# Patient Record
Sex: FEMALE | Race: WHITE | NOT HISPANIC OR LATINO | Employment: PART TIME | URBAN - METROPOLITAN AREA
[De-identification: names, ages, dates, MRNs, and addresses within clinical notes are randomized per-mention and may not be internally consistent; named-entity substitution may affect disease eponyms.]

---

## 2017-01-05 ENCOUNTER — GENERIC CONVERSION - ENCOUNTER (OUTPATIENT)
Dept: OTHER | Facility: OTHER | Age: 47
End: 2017-01-05

## 2017-03-13 ENCOUNTER — ALLSCRIPTS OFFICE VISIT (OUTPATIENT)
Dept: OTHER | Facility: OTHER | Age: 47
End: 2017-03-13

## 2017-05-22 ENCOUNTER — ALLSCRIPTS OFFICE VISIT (OUTPATIENT)
Dept: OTHER | Facility: OTHER | Age: 47
End: 2017-05-22

## 2017-06-27 ENCOUNTER — GENERIC CONVERSION - ENCOUNTER (OUTPATIENT)
Dept: OTHER | Facility: OTHER | Age: 47
End: 2017-06-27

## 2017-06-27 DIAGNOSIS — N92.6 IRREGULAR MENSTRUATION: ICD-10-CM

## 2017-07-31 ENCOUNTER — ALLSCRIPTS OFFICE VISIT (OUTPATIENT)
Dept: OTHER | Facility: OTHER | Age: 47
End: 2017-07-31

## 2017-10-23 ENCOUNTER — ALLSCRIPTS OFFICE VISIT (OUTPATIENT)
Dept: OTHER | Facility: OTHER | Age: 47
End: 2017-10-23

## 2018-01-09 NOTE — PROGRESS NOTES
Chief Complaint  Patient is here for her Depo Provera 150mg injection  Injection was given in the Right Deltoid with out any difficulty  She did not want the injection given in her buttock  Her next injection will be given at her yearly visit with Servando Baker in [place]  Filled Depo Provera once so that she has one to bring  - KM    Depo Provera Anai Jobs  Lot: [id number]  Exp:01/31/2022  NDC: 44502376773         Active Problems    1  Abdominal pain (789 00) (R10 9)   2  Arthralgia (719 40) (M25 50)   3  Bacterial vaginosis (616 10,041 9) (N76 0,B96 89)   4  Contraception (V25 9) (Z30 9)   5  Depression with anxiety (300 4) (F41 8)   6  Dermoid cyst of upper extremity, right (216 6) (D23 61)   7  Encounter for gynecological examination without abnormal finding (V72 31) (Z01 419)   8  Encounter for management and injection of depo-Provera (V25 49) (Z30 42)   9  Encounter for screening mammogram for malignant neoplasm of breast (V76 12)   (Z12 31)   10  Generalized anxiety disorder (300 02) (F41 1)   11  Herpes simplex infection (054 9) (B00 9)   12  Insomnia (780 52) (G47 00)   13  Irregular bleeding (626 4) (N92 6)   14  Reactive airway disease (493 90) (J45 909)    Current Meds   1  ALPRAZolam 0 5 MG Oral Tablet; TAKE 1 TABLET BY MOUTH TWICE A DAY AS   NEEDED; Therapy: 70Ygf0938 to ([place])  Requested for: 27Jun2016; Last   Rx:27Jun2016 Ordered   2  MedroxyPROGESTERone Acetate 150 MG/ML Intramuscular Suspension; INJECT   INTRAMUSCULARLY EVERY 12 WEEKS AS DIRECTED; Therapy: 94IZW0214-; Last Rx:17Czi2600; Status: NEED INFORMATION - Billable   Problem Ordered   3  Meloxicam 15 MG Oral Tablet; Therapy: (Recorded:13Oct2016) to Recorded   4  Multi-Vitamin Daily Oral Tablet Recorded   5  Proventil  (90 Base) MCG/ACT Inhalation Aerosol Solution; INHALE 1 TO 2   PUFFS EVERY 4 TO 6 HOURS AS NEEDED  Requested for: 09DCT5234; Last   Rx:26Jan2016 Ordered   6  Tinidazole 500 MG Oral Tablet;  Two po daily x five days;   Therapy: 85BUZ3277 to (Evaluate:18Oct2016); Last Rx:13Oct2016 Ordered   7  TraMADol HCl - 50 MG Oral Tablet; ONE PO Q 8 HRS  PRN; Therapy: 44Vbu2717 to (Last Rx:27Jun2016) Ordered    Allergies    1  Bactrim DS TABS   2  Sulfa Drugs   3   Lexapro    Vitals  Signs    Systolic: 684, LUE, Sitting  Diastolic: 78, LUE, Sitting  Height: 5 ft 5 in  Weight: 141 lb   BMI Calculated: 23 46  BSA Calculated: 1 71  LMP: Depo    Plan  Contraception    · MedroxyPROGESTERone Acetate 150 MG/ML Intramuscular Suspension  (Depo-Provera); INJECT EVERY 12 WEEKS AS DIRECTED  Encounter for management and injection of depo-Provera    · Depo-Provera 150 MG/ML Intramuscular Suspension  (MedroxyPROGESTERone Acetate)    Future Appointments    Date/Time Provider Specialty Site   01/16/2018 09:40 AM Mario Little, [address] [facility]     Signatures   Electronically signed by : WASHINGTON Aparicio; Oct 23 2017 12:27PM EST                       (Author)    Electronically signed by : Chet Murphy DO; Oct 23 2017  1:46PM EST

## 2018-01-10 NOTE — PROGRESS NOTES
Chief Complaint  Pt is here for Depo shot given on right buttocks      Active Problems    1  Abdominal pain (789 00) (R10 9)   2  Arthralgia (719 40) (M25 50)   3  Bacterial vaginosis (616 10,041 9) (N76 0,B96 89)   4  Contraception (V25 9) (Z30 9)   5  Depression with anxiety (300 4) (F41 8)   6  Dermoid cyst of upper extremity, right (216 6) (D23 61)   7  Encounter for gynecological examination without abnormal finding (V72 31) (Z01 419)   8  Encounter for management and injection of depo-Provera (V25 49) (Z30 42)   9  Encounter for screening mammogram for malignant neoplasm of breast (V76 12)   (Z12 31)   10  Generalized anxiety disorder (300 02) (F41 1)   11  Herpes simplex infection (054 9) (B00 9)   12  Insomnia (780 52) (G47 00)   13  Irregular bleeding (626 4) (N92 6)   14  Reactive airway disease (493 90) (J45 909)    Current Meds   1  ALPRAZolam 0 5 MG Oral Tablet; TAKE 1 TABLET BY MOUTH TWICE A DAY AS   NEEDED; Therapy: 99Tsf6469 to (Brianna Martinez)  Requested for: 27Jun2016; Last   Rx:27Jun2016 Ordered   2  MedroxyPROGESTERone Acetate 150 MG/ML Intramuscular Suspension; INJECT   INTRAMUSCULARLY EVERY 12 WEEKS AS DIRECTED; Therapy: 72ZEU5780-; Last Rx:10Pke3423; Status: NEED INFORMATION - Billable   Problem Ordered   3  Meloxicam 15 MG Oral Tablet; Therapy: (Recorded:13Oct2016) to Recorded   4  Multi-Vitamin Daily Oral Tablet Recorded   5  Proventil  (90 Base) MCG/ACT Inhalation Aerosol Solution; INHALE 1 TO 2   PUFFS EVERY 4 TO 6 HOURS AS NEEDED  Requested for: 50EKP3244; Last   Rx:26Jan2016 Ordered   6  Tinidazole 500 MG Oral Tablet; Two po daily x five days; Therapy: 05TFD3629 to (Evaluate:18Oct2016); Last Rx:13Oct2016 Ordered   7  TraMADol HCl - 50 MG Oral Tablet; ONE PO Q 8 HRS  PRN; Therapy: 70Zve9997 to (Last Rx:27Jun2016) Ordered    Allergies    1  Bactrim DS TABS   2   Sulfa Drugs    Vitals  Signs    Systolic: 820, RUE, Sitting  Diastolic: 80, RUE, Sitting  Height: 5 ft 5 in  Weight: 138 lb   BMI Calculated: 22 96  BSA Calculated: 1 69  LMP: Depo    Future Appointments    Date/Time Provider Specialty Site   10/23/2017 10:00 AM [facility] INJECTION, Nurse Schedule  [facility] OB     Signatures   Electronically signed by : Gabriel Ayala MA; Jul 31 2017  2:14PM EST                       (Author)    Electronically signed by : Kaila Isbell, [facility]; Jul 31 2017  2:25PM EST                       (Author)    Electronically signed by : Kierra Valero MD; Jul 31 2017  8:03PM EST

## 2018-01-10 NOTE — MISCELLANEOUS
Message   Recorded as Task   Date: 03/07/2016 01:25 PM, Created By: Carroll Tucker   Task Name: Call Back   Assigned To: Jamal Trujillo   Regarding Patient: Lionel Abdi, Status: Active   Comment:    AmttAmishNyla - 07 Mar 2016 1:25 PM     TASK CREATED  Caller: Self; [phone] (Home); [phone] (Work)  see previous task 3/7/16    pt is returning the message she received and is asking for a call back [address] [place] [address] Mar 2016 2:20 PM     TASK EDITED   Javier,[place] - 07 Mar 2016 2:22 PM     TASK EDITED  called pt again- L/m for her to call prior to 4pm        Active Problems    1  Arthralgia (719 40) (M25 50)   2  Contraceptives (V25 02)   3  Depression with anxiety (300 4) (F41 8)   4  Encounter for gynecological examination without abnormal finding (V72 31) (Z01 419)   5  Encounter for mammogram to establish baseline mammogram (V76 12) (Z12 31)   6  Encounter for management and injection of depo-Provera (V25 49) (Z30 42)   7  Generalized anxiety disorder (300 02) (F41 1)   8  Herpes simplex infection (054 9) (B00 9)   9  Reactive airway disease (493 90) (J45 909)   10  Surveillance for medroxyprogesterone contraception (V25 49) (Z30 42)    Current Meds   1  ALPRAZolam 0 5 MG Oral Tablet; TAKE 1 TABLET BY MOUTH TWICE A DAY AS   NEEDED; Therapy: 30Pzq3453 to (Hugo Gallardo)  Requested for: 03KYL0090; Last   Rx:13Kot6930 Ordered   2  Escitalopram Oxalate 10 MG Oral Tablet; TAKE 1 TABLET EVERY DAY; Therapy: 77JOR7140 to (Evaluate:38Ejv7421)  Requested for: 06UDC5167; Last   Rx:95Bxa5885 Ordered   3  MedroxyPROGESTERone Acetate 150 MG/ML Intramuscular Suspension; INJECT 1 ML   INTRAMUSCULARLY ONCE EVERY 3 MONTHS; To Be Done: 99FBX3735; Status:   HOLD FOR - Administration Ordered   4  MedroxyPROGESTERone Acetate 150 MG/ML Intramuscular Suspension; INJECT 1 ML   INTRAMUSCULARLY ONCE EVERY 3 MONTHS; To Be Done: 23JXI8712; Status:   HOLD FOR - Administration Ordered   5  MedroxyPROGESTERone Acetate 150 MG/ML Intramuscular Suspension; INJECT 1 ML   INTRAMUSCULARLY ONCE EVERY 3 MONTHS; To Be Done: 54ATM0569; Status:   HOLD FOR - Administration Ordered   6  MedroxyPROGESTERone Acetate 150 MG/ML Intramuscular Suspension; INJECT 1 ML   INTRAMUSCULARLY ONCE EVERY 3 MONTHS; To Be Done: 51OSF1419; Status:   HOLD FOR - Administration Ordered   7  MedroxyPROGESTERone Acetate 150 MG/ML Intramuscular Suspension; inject 1 ml   intramuscularly once every 3 months; Therapy: 05QJY6949 to (Evaluate:27Vuo9724)  Requested for: 48Qzm5818; Last   Rx:31Jyl3371 Ordered   8  MedroxyPROGESTERone Acetate 150 MG/ML Intramuscular Suspension; INJECT   EVERY 12 WEEKS AS DIRECTED; To Be Done:   16Pdj2193; Status: HOLD FOR - Administration Ordered   9  MedroxyPROGESTERone Acetate 150 MG/ML Intramuscular Suspension; INJECT   INTRAMUSCULARLY EVERY 12 WEEKS AS DIRECTED; To Be   Done: 72UJX4570; Status: HOLD FOR - Administration Ordered   10  Multi-Vitamin Daily Oral Tablet Recorded   11  Proventil  (90 Base) MCG/ACT Inhalation Aerosol Solution; INHALE 1 TO 2    PUFFS EVERY 4 TO 6 HOURS AS NEEDED  Requested for: 11MGC6336; Last    Rx:51Nqd1687 Ordered    Allergies    1  Bactrim DS TABS   2   Sulfa Drugs    Signatures   Electronically signed by : Pankaj Gross RN; Mar  9 2016  9:57AM EST                       (Author)

## 2018-01-10 NOTE — MISCELLANEOUS
Message   Recorded as Task   Date: 09/28/2016 10:01 AM, Created By: Joe Alford   Task Name: Med Renewal Request   Assigned To: Allie Jackson   Regarding Patient: Mercedez Galicia, Status: Active   Comment:    Nataly Guardado - 28 Sep 2016 10:01 AM     TASK CREATED  PT NEEDS A REFILL ON HER DEPO TO BE SENT TO [facility] [place]   Allie Jackson - 28 Sep 2016 10:14 AM     TASK EDITED                 sent to pharm        Active Problems    1  Arthralgia (719 40) (M25 50)   2  Bacterial vaginosis (616 10,041 9) (N76 0,B96 89)   3  Contraceptives (V25 02)   4  Depression with anxiety (300 4) (F41 8)   5  Dermoid cyst of upper extremity, right (216 6) (D23 61)   6  Encounter for gynecological examination without abnormal finding (V72 31) (Z01 419)   7  Encounter for mammogram to establish baseline mammogram (V76 12) (Z12 31)   8  Encounter for management and injection of depo-Provera (V25 49) (Z30 42)   9  Generalized anxiety disorder (300 02) (F41 1)   10  Herpes simplex infection (054 9) (B00 9)   11  Insomnia (780 52) (G47 00)   12  Reactive airway disease (493 90) (J45 909)   13  Skin lesion (709 9) (L98 9)   14  Surveillance for medroxyprogesterone contraception (V25 49) (Z30 42)    Current Meds   1  ALPRAZolam 0 5 MG Oral Tablet; TAKE 1 TABLET BY MOUTH TWICE A DAY AS   NEEDED; Therapy: 23Hqh0643 to (Sonal Roach)  Requested for: 27Jun2016; Last   Rx:27Jun2016 Ordered   2  Escitalopram Oxalate 10 MG Oral Tablet; take one tablet by mouth every day; Therapy: 14QKC8060 to (Evaluate:96Glh5251)  Requested for: 49Rzu0416; Last   Rx:71Gum5691 Ordered   3  MedroxyPROGESTERone Acetate 150 MG/ML Intramuscular Suspension   (Depo-Provera); inject 1 ml intramuscularly once every 3   months; Therapy: 94HIN2147-  Requested for: 96EBR6491; Last Rx:27Rop3728; Status: NEED   INFORMATION - Billable Problem Ordered   4  Multi-Vitamin Daily Oral Tablet Recorded   5   Proventil  (90 Base) MCG/ACT Inhalation Aerosol Solution; INHALE 1 TO 2   PUFFS EVERY 4 TO 6 HOURS AS NEEDED  Requested for: 84LTV9423; Last   Rx:26Jan2016 Ordered   6  TraMADol HCl - 50 MG Oral Tablet; ONE PO Q 8 HRS  PRN; Therapy: 30Apr2015 to (Last Rx:27Jun2016) Ordered   7  Zolpidem Tartrate 5 MG Oral Tablet; TAKE ONE TABLET AT BEDTIME AS NEEDED FOR   SLEEP; Therapy: 28Apr2016 to (Evaluate:00Jiv8513); Last Rx:28Apr2016 Ordered    Allergies    1  Bactrim DS TABS   2   Sulfa Drugs    Plan  Contraceptives    · MedroxyPROGESTERone Acetate 150 MG/ML Intramuscular Suspension; INJECT  INTRAMUSCULARLY EVERY 12 WEEKS AS DIRECTED    Signatures   Electronically signed by : Sarahy Buchanan, ; Sep 28 2016 10:14AM EST                       (Author)

## 2018-01-11 NOTE — MISCELLANEOUS
Message   Recorded as Task   Date: 03/08/2016 04:29 PM, Created By: Oxana Chapman   Task Name: Follow Up   Assigned To: Brentdeng Sg   Regarding Patient: Madeline Lopez, Status: In Progress   Comment:    Kendal Grier - 08 Mar 2016 4:29 PM     TASK CREATED  lm for pt tcb re flagyl rx   Kendal Grier - 09 Mar 2016 8:17 AM     TASK IN PROGRESS   Kendal Grier - 09 Mar 2016 8:24 AM     TASK EDITED  left message that rx was sent to pharm  on 03/07 by cg    tcb with any questions        Active Problems    1  Arthralgia (719 40) (M25 50)   2  Bacterial vaginosis (616 10,041 9) (N76 0,B96 89)   3  Contraceptives (V25 02)   4  Depression with anxiety (300 4) (F41 8)   5  Encounter for gynecological examination without abnormal finding (V72 31) (Z01 419)   6  Encounter for mammogram to establish baseline mammogram (V76 12) (Z12 31)   7  Encounter for management and injection of depo-Provera (V25 49) (Z30 42)   8  Generalized anxiety disorder (300 02) (F41 1)   9  Herpes simplex infection (054 9) (B00 9)   10  Reactive airway disease (493 90) (J45 909)   11  Surveillance for medroxyprogesterone contraception (V25 49) (Z30 42)    Current Meds   1  ALPRAZolam 0 5 MG Oral Tablet; TAKE 1 TABLET BY MOUTH TWICE A DAY AS   NEEDED; Therapy: 43Reh7194 to (Marine Rm)  Requested for: 27WHZ3964; Last   Rx:98Yqg0492 Ordered   2  Escitalopram Oxalate 10 MG Oral Tablet; TAKE 1 TABLET EVERY DAY; Therapy: 74UMM6671 to (Evaluate:44Rvy3441)  Requested for: 91BHT9617; Last   Rx:52Xfz2366 Ordered   3  MedroxyPROGESTERone Acetate 150 MG/ML Intramuscular Suspension   (Depo-Provera); inject 1 ml intramuscularly once every 3   months; Therapy: 89YLR8022 to (Evaluate:12Znt4861)  Requested for: 60Qpt5162; Last   Rx:46Taa6545 Ordered   4  MedroxyPROGESTERone Acetate 150 MG/ML Intramuscular Suspension; INJECT 1 ML   INTRAMUSCULARLY ONCE EVERY 3 MONTHS; To Be Done: 52DWU6242; Status:   HOLD FOR - Administration Ordered   5  MedroxyPROGESTERone Acetate 150 MG/ML Intramuscular Suspension; INJECT 1 ML   INTRAMUSCULARLY ONCE EVERY 3 MONTHS; To Be Done: 47IYU6070; Status:   HOLD FOR - Administration Ordered   6  MedroxyPROGESTERone Acetate 150 MG/ML Intramuscular Suspension; INJECT 1 ML   INTRAMUSCULARLY ONCE EVERY 3 MONTHS; To Be Done: 46RPZ4465; Status:   HOLD FOR - Administration Ordered   7  MedroxyPROGESTERone Acetate 150 MG/ML Intramuscular Suspension; INJECT 1 ML   INTRAMUSCULARLY ONCE EVERY 3 MONTHS; To Be Done: 57NBQ4976; Status:   HOLD FOR - Administration Ordered   8  MedroxyPROGESTERone Acetate 150 MG/ML Intramuscular Suspension; INJECT   EVERY 12 WEEKS AS DIRECTED; To Be Done:   47Zya7457; Status: HOLD FOR - Administration Ordered   9  MedroxyPROGESTERone Acetate 150 MG/ML Intramuscular Suspension; INJECT   INTRAMUSCULARLY EVERY 12 WEEKS AS DIRECTED; To Be   Done: 01BUO6685; Status: HOLD FOR - Administration Ordered   10  MetroNIDAZOLE 500 MG Oral Tablet (Flagyl); TAKE 1 TABLET TWICE DAILY UNTIL    FINISHED; Therapy: 78HAV6242 to (Evaluate:14Mar2016)  Requested for: 26ACR0432; Last    Rx:07Mar2016 Ordered   11  Multi-Vitamin Daily Oral Tablet Recorded   12  Proventil  (90 Base) MCG/ACT Inhalation Aerosol Solution; INHALE 1 TO 2    PUFFS EVERY 4 TO 6 HOURS AS NEEDED  Requested for: 38CQW3230; Last    Rx:26Jan2016 Ordered   13  TraMADol HCl - 50 MG Oral Tablet; ONE PO Q 8 HRS  PRN; Therapy: 30Apr2015 to (Last Rx:07Mar2016) Ordered    Allergies    1  Bactrim DS TABS   2   Sulfa Drugs    Signatures   Electronically signed by : Keila Preston, ; Mar  9 2016  8:24AM EST                       (Author)

## 2018-01-12 NOTE — MISCELLANEOUS
Message   Recorded as Task   Date: 03/07/2016 09:44 AM, Created By: Maverick Love   Task Name: Medical Complaint Callback   Assigned To: Cholo Jerry   Regarding Patient: Flo Farrell, Status: Active   Comment:    Maverick Love - 07 Mar 2016 9:44 AM     TASK CREATED  Caller: Self; Medical Complaint; [phone] (Home); [phone] (Work)  pt called - thinks has a BV - thick pasty discharge, slight odor - said had previously - please advise  [phone]  her pharm:  [facility] in [facility] - 07 Mar 2016 12:57 PM     TASK EDITED  attempted to return pts' p c   ,L/M for pt to return call today if possible        Active Problems    1  Arthralgia (719 40) (M25 50)   2  Contraceptives (V25 02)   3  Depression with anxiety (300 4) (F41 8)   4  Encounter for gynecological examination without abnormal finding (V72 31) (Z01 419)   5  Encounter for mammogram to establish baseline mammogram (V76 12) (Z12 31)   6  Encounter for management and injection of depo-Provera (V25 49) (Z30 42)   7  Generalized anxiety disorder (300 02) (F41 1)   8  Herpes simplex infection (054 9) (B00 9)   9  Reactive airway disease (493 90) (J45 909)   10  Surveillance for medroxyprogesterone contraception (V25 49) (Z30 42)    Current Meds   1  ALPRAZolam 0 5 MG Oral Tablet; TAKE 1 TABLET BY MOUTH TWICE A DAY AS   NEEDED; Therapy: 78Kwt1731 to (Joby Garcia)  Requested for: 45FQA9320; Last   Rx:53Lnf5123 Ordered   2  Escitalopram Oxalate 10 MG Oral Tablet; TAKE 1 TABLET EVERY DAY; Therapy: 56ZOX3655 to (Evaluate:31Qfr5226)  Requested for: 72TGR4973; Last   Rx:18Qin3070 Ordered   3  MedroxyPROGESTERone Acetate 150 MG/ML Intramuscular Suspension; INJECT 1 ML   INTRAMUSCULARLY ONCE EVERY 3 MONTHS; To Be Done: 08CDR4642; Status:   HOLD FOR - Administration Ordered   4  MedroxyPROGESTERone Acetate 150 MG/ML Intramuscular Suspension; INJECT 1 ML   INTRAMUSCULARLY ONCE EVERY 3 MONTHS;  To Be Done: 47BTJ6965; Status:   HOLD FOR - Administration Ordered   5  MedroxyPROGESTERone Acetate 150 MG/ML Intramuscular Suspension; INJECT 1 ML   INTRAMUSCULARLY ONCE EVERY 3 MONTHS; To Be Done: 18TBF0978; Status:   HOLD FOR - Administration Ordered   6  MedroxyPROGESTERone Acetate 150 MG/ML Intramuscular Suspension; INJECT 1 ML   INTRAMUSCULARLY ONCE EVERY 3 MONTHS; To Be Done: 88OKR7398; Status:   HOLD FOR - Administration Ordered   7  MedroxyPROGESTERone Acetate 150 MG/ML Intramuscular Suspension; inject 1 ml   intramuscularly once every 3 months; Therapy: 95KLT8175 to (Evaluate:26Ngx9340)  Requested for: 60Ijp5106; Last   Rx:08Fti0619 Ordered   8  MedroxyPROGESTERone Acetate 150 MG/ML Intramuscular Suspension; INJECT   EVERY 12 WEEKS AS DIRECTED; To Be Done:   59Jeo2395; Status: HOLD FOR - Administration Ordered   9  MedroxyPROGESTERone Acetate 150 MG/ML Intramuscular Suspension; INJECT   INTRAMUSCULARLY EVERY 12 WEEKS AS DIRECTED; To Be   Done: 63EON3524; Status: HOLD FOR - Administration Ordered   10  Multi-Vitamin Daily Oral Tablet Recorded   11  Proventil  (90 Base) MCG/ACT Inhalation Aerosol Solution; INHALE 1 TO 2    PUFFS EVERY 4 TO 6 HOURS AS NEEDED  Requested for: 62VHY0707; Last    Rx:44Hbr5780 Ordered    Allergies    1  Bactrim DS TABS   2   Sulfa Drugs    Signatures   Electronically signed by : Amena Bianchi RN; Mar  9 2016  9:56AM EST                       (Author)

## 2018-01-12 NOTE — PROGRESS NOTES
Chief Complaint  Depo Injection 150 mg  Lot # [id number]  Exp 07/2019  NDC [address]-1  Site Right Buttock      Active Problems    1  Abdominal pain (789 00) (R10 9)   2  Arthralgia (719 40) (M25 50)   3  Bacterial vaginosis (616 10,041 9) (N76 0,B96 89)   4  Depression with anxiety (300 4) (F41 8)   5  Dermoid cyst of upper extremity, right (216 6) (D23 61)   6  Encounter for gynecological examination without abnormal finding (V72 31) (Z01 419)   7  Encounter for management and injection of depo-Provera (V25 49) (Z30 42)   8  Encounter for screening mammogram for malignant neoplasm of breast (V76 12)   (Z12 31)   9  Generalized anxiety disorder (300 02) (F41 1)   10  Herpes simplex infection (054 9) (B00 9)   11  Insomnia (780 52) (G47 00)   12  Reactive airway disease (493 90) (J45 909)    Current Meds   1  ALPRAZolam 0 5 MG Oral Tablet; TAKE 1 TABLET BY MOUTH TWICE A DAY AS   NEEDED; Therapy: 00Xcv1086 to (Remington Daigle)  Requested for: 27Jun2016; Last   Rx:27Jun2016 Ordered   2  MedroxyPROGESTERone Acetate 150 MG/ML Intramuscular Suspension; INJECT   INTRAMUSCULARLY EVERY 12 WEEKS AS DIRECTED; Therapy: 63KXK3968-; Last Rx:45Bcb2713; Status: NEED INFORMATION - Billable   Problem Ordered   3  Meloxicam 15 MG Oral Tablet; Therapy: (Recorded:13Oct2016) to Recorded   4  Multi-Vitamin Daily Oral Tablet Recorded   5  Proventil  (90 Base) MCG/ACT Inhalation Aerosol Solution; INHALE 1 TO 2   PUFFS EVERY 4 TO 6 HOURS AS NEEDED  Requested for: 55WWU3978; Last   Rx:26Jan2016 Ordered   6  Tinidazole 500 MG Oral Tablet; Two po daily x five days; Therapy: 12FOQ1832 to (Evaluate:18Oct2016); Last Rx:13Oct2016 Ordered   7  TraMADol HCl - 50 MG Oral Tablet; ONE PO Q 8 HRS  PRN; Therapy: 57Mby3469 to (Last Rx:27Jun2016) Ordered    Allergies    1  Bactrim DS TABS   2   Sulfa Drugs    Vitals  Signs    Systolic: 977, LUE, Sitting  Diastolic: 64, LUE, Sitting  Height: 5 ft 5 in  Weight: 144 lb 5 oz  BMI Calculated: 24 01  BSA Calculated: 1 72  LMP: Depo Injection    Plan  Encounter for management and injection of depo-Provera    · MedroxyPROGESTERone Acetate 150 MG/ML Intramuscular Suspension    Future Appointments    Date/Time Provider Specialty Site   05/22/2017 10:00 AM EMMAGA INJECTION, Nurse Schedule  [facility]     Signatures   Electronically signed by : WASHINGTON Restrepo; Mar 13 2017 10:34AM EST                       (Author)    Electronically signed by : Elliot Simmons DO; Mar 15 2017  9:21AM EST

## 2018-01-13 VITALS
SYSTOLIC BLOOD PRESSURE: 110 MMHG | HEIGHT: 65 IN | BODY MASS INDEX: 23.49 KG/M2 | WEIGHT: 141 LBS | DIASTOLIC BLOOD PRESSURE: 78 MMHG

## 2018-01-13 NOTE — MISCELLANEOUS
Message   Recorded as Task   Date: 01/05/2017 11:19 AM, Created By: Johanna Bradley   Task Name: Call Back   Assigned To: DAYANARA GYN,Team   Regarding Patient: Cruzito Hunter, Status: In Progress   Comment:    Leatha Arias - 05 Jan 2017 11:19 AM     TASK CREATED  Caller: Self; Other; [phone] (Home); [phone] (Work)  pts bacterial inf came back, w87 stated for her to call back & we would call a different rx in, pls call pt @ [facility] Jan 2017 11:38 AM     TASK [address] - 05 Jan 2017 11:41 AM     TASK EDITED  lmtcb  Pt was given Clindesse savings cARD AT [facility]  Eleazar Winter - 05 Jan 2017 12:48 PM     TASK EDITED  pt says she has a thick, pasty dsch and odor  May I rx Clindese? Megha Laughlin - 05 Jan 2017 1:56 PM     TASK REPLIED TO: Previously Assigned To Megha Laughlin  yes x   Eleazar Winter - 05 Jan 2017 3:27 PM     TASK EDITED  Rx clindesse to ehr  Pt has savings card        Active Problems    1  Abdominal pain (789 00) (R10 9)   2  Arthralgia (719 40) (M25 50)   3  Bacterial vaginosis (616 10,041 9) (N76 0,B96 89)   4  Depression with anxiety (300 4) (F41 8)   5  Dermoid cyst of upper extremity, right (216 6) (D23 61)   6  Encounter for gynecological examination without abnormal finding (V72 31) (Z01 419)   7  Encounter for management and injection of depo-Provera (V25 49) (Z30 42)   8  Encounter for screening mammogram for malignant neoplasm of breast (V76 12)   (Z12 31)   9  Generalized anxiety disorder (300 02) (F41 1)   10  Herpes simplex infection (054 9) (B00 9)   11  Insomnia (780 52) (G47 00)   12  Reactive airway disease (493 90) (J45 909)    Current Meds   1  ALPRAZolam 0 5 MG Oral Tablet; TAKE 1 TABLET BY MOUTH TWICE A DAY AS   NEEDED; Therapy: 74Mgf1739 to (Freya Brown)  Requested for: 27Jun2016; Last   Rx:27Jun2016 Ordered   2   MedroxyPROGESTERone Acetate 150 MG/ML Intramuscular Suspension; INJECT INTRAMUSCULARLY EVERY 12 WEEKS AS DIRECTED; Therapy: 18YHR9300-; Last Rx:75Cbn4392; Status: NEED INFORMATION - Billable   Problem Ordered   3  Meloxicam 15 MG Oral Tablet; Therapy: (Recorded:13Oct2016) to Recorded   4  Multi-Vitamin Daily Oral Tablet Recorded   5  Proventil  (90 Base) MCG/ACT Inhalation Aerosol Solution; INHALE 1 TO 2   PUFFS EVERY 4 TO 6 HOURS AS NEEDED  Requested for: 12QDU0086; Last   Rx:26Jan2016 Ordered   6  Tinidazole 500 MG Oral Tablet (Tindamax); Two po daily x five days; Therapy: 97DAY3974 to (Evaluate:18Oct2016); Last Rx:13Oct2016 Ordered   7  TraMADol HCl - 50 MG Oral Tablet; ONE PO Q 8 HRS  PRN; Therapy: 30Apr2015 to (Last Rx:27Jun2016) Ordered    Allergies    1  Bactrim DS TABS   2  Sulfa Drugs    Plan  Bacterial vaginosis    · Clindesse 2 % Vaginal Cream; INSERT 1 APPLICATORFUL INTRAVAGINALLY AT  BEDTIME    Signatures   Electronically signed by :  Pola Hernandez, ; Jan 5 2017  3:29PM EST                       (Author)

## 2018-01-13 NOTE — MISCELLANEOUS
Message   Recorded as Task   Date: 06/27/2017 12:44 PM, Created By: Abram Combs   Task Name: Medical Complaint Callback   Assigned To: Hiram Dover   Regarding Patient: Daniel Reyez, Status: Active   Comment:    Tammy Keller - 27 Jun 2017 12:44 PM     TASK CREATED  Caller: Self; Medical Complaint; [phone] (Home)  Pt called and would like to discuss her problems w the Depo shot  Pt is having constant bleeding and frequent headaches  Pt would like to speak directly with you  Megha Laughlin - 27 Jun 2017 1:07 PM     TASK REPLIED TO: Previously Assigned To Megha Laughlin  left message for pt to call back   LocFide brown - 27 Jun 2017 1:33 PM     TASK EDITED  pt returning your call-will await callback  thanks  Megha Laughlin - 27 Jun 2017 2:58 PM     TASK REPLIED TO: Previously Assigned To [employer] to pt  On Depo  She reports "a few months of irregular bleeding "  She wrote down starting 5/16 but had happened prior  Notes bleeding 5/16-20  Depo 5/22  Bled 5/26-5/31, 6/16-6/21, 6/23-24, and 6/26 to now  All light bleeding/ spotting, no pain  Recommended pelvic US, TSH and endometrial biopsy  Pt currently without insurance  Will send slips for pelvic US and TSH - when back will schedule EMB  Not sure if she will continue Depo or not  Did discuss option for Mirena  Plan  Irregular bleeding    · (1) TSH; Status:Active; Requested PMH:[id number];    · * US PELVIS COMPLETE (TRANSABDOMINAL AND TRANSVAGINAL); Status:Active;   Requested [facility]:[id number];     Signatures   Electronically signed by : Hayden Francis, [facility]; Jun 27 2017  2:58PM EST                       (Author)

## 2018-01-13 NOTE — MISCELLANEOUS
Provider Comments  Provider Comments:   PATIENT WAS A NO SHOW FOR HER APPOINTMENT ON 04/25/2016      Signatures   Electronically signed by : LIAM Zavaleta ; Apr 25 0118  9:40AM EST                       (Author)

## 2018-01-14 VITALS
HEIGHT: 65 IN | SYSTOLIC BLOOD PRESSURE: 120 MMHG | BODY MASS INDEX: 24.04 KG/M2 | WEIGHT: 144.31 LBS | DIASTOLIC BLOOD PRESSURE: 64 MMHG

## 2018-01-14 NOTE — PROGRESS NOTES
Chief Complaint  Patient was here today for her depo injection   Patient reports that she has been some break though bleeding and having night sweat and also she reports that she is having hot flashes      Active Problems    1  Abdominal pain (789 00) (R10 9)   2  Arthralgia (719 40) (M25 50)   3  Bacterial vaginosis (616 10,041 9) (N76 0,B96 89)   4  Depression with anxiety (300 4) (F41 8)   5  Dermoid cyst of upper extremity, right (216 6) (D23 61)   6  Encounter for gynecological examination without abnormal finding (V72 31) (Z01 419)   7  Encounter for management and injection of depo-Provera (V25 49) (Z30 42)   8  Encounter for screening mammogram for malignant neoplasm of breast (V76 12)   (Z12 31)   9  Generalized anxiety disorder (300 02) (F41 1)   10  Herpes simplex infection (054 9) (B00 9)   11  Insomnia (780 52) (G47 00)   12  Reactive airway disease (493 90) (J45 909)    Current Meds   1  ALPRAZolam 0 5 MG Oral Tablet; TAKE 1 TABLET BY MOUTH TWICE A DAY AS   NEEDED; Therapy: 44Vav9078 to (Smooth Carvajal)  Requested for: 27Jun2016; Last   Rx:27Jun2016 Ordered   2  MedroxyPROGESTERone Acetate 150 MG/ML Intramuscular Suspension; INJECT   INTRAMUSCULARLY EVERY 12 WEEKS AS DIRECTED; Therapy: 45QZN6735-; Last Rx:69Tzy9153; Status: NEED INFORMATION - Billable   Problem Ordered   3  Meloxicam 15 MG Oral Tablet; Therapy: (Recorded:13Oct2016) to Recorded   4  Multi-Vitamin Daily Oral Tablet Recorded   5  Proventil  (90 Base) MCG/ACT Inhalation Aerosol Solution; INHALE 1 TO 2   PUFFS EVERY 4 TO 6 HOURS AS NEEDED  Requested for: 40ZZQ3270; Last   Rx:26Jan2016 Ordered   6  Tinidazole 500 MG Oral Tablet; Two po daily x five days; Therapy: 06XPO0796 to (Evaluate:18Oct2016); Last Rx:13Oct2016 Ordered   7  TraMADol HCl - 50 MG Oral Tablet; ONE PO Q 8 HRS  PRN; Therapy: 30Apr2015 to (Last Rx:27Jun2016) Ordered    Allergies    1  Bactrim DS TABS   2   Sulfa Drugs    Vitals  Signs    Systolic: 224, LUE, Sitting  Diastolic: 64, LUE, Sitting  Height: 5 ft 5 in  Weight: 142 lb   BMI Calculated: 23 63  BSA Calculated: 1 71  LMP: depo    Plan  Contraception    · MedroxyPROGESTERone Acetate 150 MG/ML Intramuscular Suspension  (Depo-Provera)    Future Appointments    Date/Time Provider Specialty Site   07/31/2017 10:00 AM EMMAGA INJECTION, Nurse Schedule  [facility]     Signatures   Electronically signed by : Tamara Ball [facility]; May 22 2017 11:38AM EST                       (Author)    Electronically signed by : Bonnie White MA; May 22 2017 11:51AM EST                       (Author)    Electronically signed by : Antonio Rankin MD; May 22 2017 12:51PM EST

## 2018-01-15 VITALS
BODY MASS INDEX: 22.99 KG/M2 | WEIGHT: 138 LBS | DIASTOLIC BLOOD PRESSURE: 80 MMHG | HEIGHT: 65 IN | SYSTOLIC BLOOD PRESSURE: 120 MMHG

## 2018-01-15 NOTE — PROGRESS NOTES
Plan  Encounter for management and injection of depo-Provera    · MedroxyPROGESTERone Acetate 150 MG/ML Intramuscular Suspension   Rx By: Zita Cross; For: Encounter for management and injection of depo-Provera; Dose of 1 ML; Intramuscular; NELY = N; Administered by: Teresa Martinez: 2/2/2016 10:37:00 AM; Last Updated By: Teresa Martinez; 2/2/2016 10:38:17 AM    Chief Complaint  Chief Complaint Free Text Note Form: Patient presents to the office for her DEPO injection  Given in right buttock  Tolerated well  NR      Active Problems    1  Arthralgia (719 40) (M25 50)   2  Contraceptives (V25 02)   3  Depression with anxiety (300 4) (F41 8)   4  Encounter for gynecological examination without abnormal finding (V72 31) (Z01 419)   5  Encounter for mammogram to establish baseline mammogram (V76 12) (Z12 31)   6  Encounter for management and injection of depo-Provera (V25 49) (Z30 49)   7  Generalized anxiety disorder (300 02) (F41 1)   8  Herpes simplex infection (054 9) (B00 9)   9  Reactive airway disease (493 90) (J45 909)   10  Surveillance for medroxyprogesterone contraception (V25 49) (Z30 42)    Past Medical History    1  History of Anxiety (300 00) (F41 9)   2  History of Arthritis (V13 4)   3  History of Asthma (493 90) (J45 909)   4  History of Bacterial vaginosis (616 10,041 9) (N76 0,A49 9)   5  History of Gynecologic Services Intrauterine Device (IUD) Removal   6  History of Herniated nucleus pulposus, L3-4 (722 10) (M51 26)   7  History of acute bronchitis (V12 69) (Z87 09)   8  History of dizziness (V13 89) (Z87 898)   9  History of headache (V13 89) (Z87 898)   10  History of Lumbar canal stenosis (724 02) (M48 06)   11  History of Lumbar radiculopathy (724 4) (M54 16)   12  History of Peptic Ulcer (V12 71)   13  History of Previously Pregnant With 1  Normal Vaginal Deliveries   14  History of Pulmonary disease (518 89) (J98 4)   15  History of Thoracic radiculopathy (724 4) (M54 14)   16   History of Urinary Tract Infection (V13 02)    Surgical History    1  History of Cervix Cryosurgery   2  Contraceptives (V25 02)    Family History    1  Family history of Arthritis (V17 7)   2  Family history of Osteoporosis (V17 81)   3  Family history of Polio   4  Family history of Psoriasis    5  Family history of Arthritis (V17 7)   6  Family history of osteoporosis (V17 81) (Z82 62)   7  Family history of Osteoporosis (V17 81)    8  Family history of Pulmonary Disease    9  Family history of Diabetes Mellitus (V18 0)   10  Family history of Heart Disease (V17 49)   11  Family history of Hypertension (V17 49)    12  Family history of Carcinoma Of The Tongue (V16 0)    13  Family history of lung cancer (V16 1) (Z80 1)    Social History    · Being A Social Drinker   · Daily Coffee Consumption (1  Cups/Day)   · Exercising Regularly   · Former smoker (R24 40) (F11 022)   · Marital History -  (V61 03)    Current Meds   1  ALPRAZolam 0 5 MG Oral Tablet; TAKE 1 TABLET BY MOUTH TWICE A DAY AS NEEDED; Therapy: 36Sto0171 to (Marla Gallego)  Requested for: 84LTC4811; Last   Rx:20Whe0542 Ordered   2  MedroxyPROGESTERone Acetate 150 MG/ML Intramuscular Suspension; INJECT 1 ML   INTRAMUSCULARLY ONCE EVERY 3 MONTHS; Recurring Schedule:06Mar2015 to   (Exp:06Mar2016); Status: IN PROGRESS - Order Generated Ordered   3  MedroxyPROGESTERone Acetate 150 MG/ML Intramuscular Suspension; INJECT 1 ML   INTRAMUSCULARLY ONCE EVERY 3 MONTHS; To Be Done: 35LYT4704; Status:   HOLD FOR - Administration Ordered   4  MedroxyPROGESTERone Acetate 150 MG/ML Intramuscular Suspension; INJECT 1 ML   INTRAMUSCULARLY ONCE EVERY 3 MONTHS; To Be Done: 18KJW9580; Status:   HOLD FOR - Administration Ordered   5  MedroxyPROGESTERone Acetate 150 MG/ML Intramuscular Suspension; INJECT 1 ML   INTRAMUSCULARLY ONCE EVERY 3 MONTHS; To Be Done: 71OKW6902; Status:   HOLD FOR - Administration Ordered   6   MedroxyPROGESTERone Acetate 150 MG/ML Intramuscular Suspension; inject 1 ml   intramuscularly once every 3 months; Therapy: 62CCD4555 to (Evaluate:63Btm7560)  Requested for: 39Btc9388; Last   Rx:10Ein2269 Ordered   7  MedroxyPROGESTERone Acetate 150 MG/ML Intramuscular Suspension; INJECT   EVERY 12 WEEKS AS DIRECTED; To Be Done: 96Otq0252;   Status: HOLD FOR - Administration Ordered   8  MedroxyPROGESTERone Acetate 150 MG/ML Intramuscular Suspension; INJECT   INTRAMUSCULARLY EVERY 12 WEEKS AS DIRECTED; To Be   Done: 30GZH9960; Status: HOLD FOR - Administration Ordered   9  Multi-Vitamin Daily Oral Tablet Recorded   10  Proventil  (90 Base) MCG/ACT Inhalation Aerosol Solution; INHALE 1 TO 2    PUFFS EVERY 4 TO 6 HOURS AS NEEDED  Requested for: 72NDM3966; Last    Rx:26Jan2016 Ordered   11  TraMADol HCl - 50 MG Oral Tablet; ONE PO Q 8 HRS  PRN; Therapy: 51Dps9766 to (Last Rx:43Bzn8386) Ordered   12  Xanax 0 5 MG Oral Tablet; Therapy: (Recorded:72Opm3522) to Recorded    Allergies    1  Bactrim DS TABS   2  Sulfa Drugs    Vitals  Vital Signs [Data Includes: Current Encounter]    Recorded: 46ERA8481 74:60SN   Systolic 430, LUE, Sitting   Diastolic 60, LUE, Sitting   Weight 144 lb    BMI Calculated 24 72   BSA Calculated 1 7   LMP 93XWM9248     Health Management  Encounter for gynecological examination without abnormal finding   BREAST EXAM; every 1 year; Next Due: 14FMS7147; Overdue  PELVIC EXAM; every 1 year; Next Due: 95QID8182;  Overdue    Future Appointments    Date/Time Provider Specialty Site   04/28/2016 09:00 AM Gio Cottrell, [facility] Obstetrics/Gynecology [facility]     Signatures   Electronically signed by : Catarino Soares; Feb 2 2016 10:50AM EST                       (Author)    Electronically signed by : Harvey Mcnulty DO; Feb 2 2016  3:06PM EST

## 2018-01-15 NOTE — MISCELLANEOUS
Message   Recorded as Task   Date: 01/05/2017 11:19 AM, Created By: Dwayne Armando   Task Name: Call Back   Assigned To: DAYANARA GYN,Team   Regarding Patient: Flo Farrell, Status: In Progress   Comment:    Leatha Arias - 05 Jan 2017 11:19 AM     TASK CREATED  Caller: Self; Other; [phone] (Home); [phone] (Work)  pts bacterial inf came back, w87 stated for her to call back & we would call a different rx in, pls call pt @ [facility] Jan 2017 11:38 AM     TASK IN [address] - 05 Jan 2017 11:41 AM     TASK EDITED  lmtcb  Pt was given Clindesse savings cARD AT [facility]  Terrell Leon - 05 Jan 2017 12:48 PM     TASK EDITED  pt says she has a thick, pasty dsch and odor  May I rx Clindese? Megha Laughlin - 05 Jan 2017 1:56 PM     TASK REPLIED TO: Previously Assigned To Megha Laughlin  yes x   Terrell Leon - 05 Jan 2017 3:27 PM     TASK EDITED  Rx clindesse to ehr  Pt has savings card        Active Problems    1  Abdominal pain (789 00) (R10 9)   2  Arthralgia (719 40) (M25 50)   3  Bacterial vaginosis (616 10,041 9) (N76 0,B96 89)   4  Depression with anxiety (300 4) (F41 8)   5  Dermoid cyst of upper extremity, right (216 6) (D23 61)   6  Encounter for gynecological examination without abnormal finding (V72 31) (Z01 419)   7  Encounter for management and injection of depo-Provera (V25 49) (Z30 42)   8  Encounter for screening mammogram for malignant neoplasm of breast (V76 12)   (Z12 31)   9  Generalized anxiety disorder (300 02) (F41 1)   10  Herpes simplex infection (054 9) (B00 9)   11  Insomnia (780 52) (G47 00)   12  Reactive airway disease (493 90) (J79 909)    Current Meds   1  ALPRAZolam 0 5 MG Oral Tablet; TAKE 1 TABLET BY MOUTH TWICE A DAY AS   NEEDED; Therapy: 65Efj6200 to (Juventino Matson)  Requested for: 27Jun2016; Last   Rx:27Jun2016 Ordered   2   MedroxyPROGESTERone Acetate 150 MG/ML Intramuscular Suspension; INJECT INTRAMUSCULARLY EVERY 12 WEEKS AS DIRECTED; Therapy: 80FSD6969-; Last Rx:88Nyk6902; Status: NEED INFORMATION - Billable   Problem Ordered   3  Meloxicam 15 MG Oral Tablet; Therapy: (Recorded:13Oct2016) to Recorded   4  Multi-Vitamin Daily Oral Tablet Recorded   5  Proventil  (90 Base) MCG/ACT Inhalation Aerosol Solution; INHALE 1 TO 2   PUFFS EVERY 4 TO 6 HOURS AS NEEDED  Requested for: 54ECM9218; Last   Rx:26Jan2016 Ordered   6  Tinidazole 500 MG Oral Tablet (Tindamax); Two po daily x five days; Therapy: 89SRR9111 to (Evaluate:18Oct2016); Last Rx:13Oct2016 Ordered   7  TraMADol HCl - 50 MG Oral Tablet; ONE PO Q 8 HRS  PRN; Therapy: 30Apr2015 to (Last Rx:27Jun2016) Ordered    Allergies    1  Bactrim DS TABS   2  Sulfa Drugs    Plan  Bacterial vaginosis    · Clindesse 2 % Vaginal Cream; INSERT 1 APPLICATORFUL INTRAVAGINALLY AT  BEDTIME    Signatures   Electronically signed by :  Ruth Hernandez, ; Jan 5 2017  3:28PM EST                       (Author)

## 2018-01-15 NOTE — PROGRESS NOTES
Chief Complaint  Patient was given medroxyprogesterone 150 mg/ml IM in left buttocks without complications  Colt Person MA      Active Problems    1  Abdominal pain (789 00) (R10 9)   2  Arthralgia (719 40) (M25 50)   3  Bacterial vaginosis (616 10,041 9) (N76 0,B96 89)   4  Depression with anxiety (300 4) (F41 8)   5  Dermoid cyst of upper extremity, right (216 6) (D23 61)   6  Encounter for gynecological examination without abnormal finding (V72 31) (Z01 419)   7  Encounter for management and injection of depo-Provera (V25 49) (Z30 42)   8  Encounter for screening mammogram for malignant neoplasm of breast (V76 12)   (Z12 31)   9  Generalized anxiety disorder (300 02) (F41 1)   10  Herpes simplex infection (054 9) (B00 9)   11  Insomnia (780 52) (G47 00)   12  Reactive airway disease (493 90) (J45 909)    Current Meds   1  ALPRAZolam 0 5 MG Oral Tablet; TAKE 1 TABLET BY MOUTH TWICE A DAY AS   NEEDED; Therapy: 79Elc1722 to (Tara Marshall)  Requested for: 27Jun2016; Last   Rx:27Jun2016 Ordered   2  MedroxyPROGESTERone Acetate 150 MG/ML Intramuscular Suspension; INJECT   INTRAMUSCULARLY EVERY 12 WEEKS AS DIRECTED; Therapy: 87RDA2523-; Last Rx:87Tql6325; Status: NEED INFORMATION - Billable   Problem Ordered   3  Meloxicam 15 MG Oral Tablet; Therapy: (Recorded:13Oct2016) to Recorded   4  Multi-Vitamin Daily Oral Tablet Recorded   5  Proventil  (90 Base) MCG/ACT Inhalation Aerosol Solution; INHALE 1 TO 2   PUFFS EVERY 4 TO 6 HOURS AS NEEDED  Requested for: 57RSX2272; Last   Rx:26Jan2016 Ordered   6  Tinidazole 500 MG Oral Tablet; Two po daily x five days; Therapy: 66LDW7877 to (Evaluate:18Oct2016); Last Rx:13Oct2016 Ordered   7  TraMADol HCl - 50 MG Oral Tablet; ONE PO Q 8 HRS  PRN; Therapy: 64Rtw9072 to (Last Rx:27Jun2016) Ordered    Allergies    1  Bactrim DS TABS   2   Sulfa Drugs    Vitals  Signs    Systolic: 223, RUE, Sitting  Diastolic: 68, RUE, Sitting  Height: 5 ft 5 in  Weight: 143 lb BMI Calculated: 23 8  BSA Calculated: 1 72    Plan  Encounter for management and injection of depo-Provera    · MedroxyPROGESTERone Acetate 150 MG/ML Intramuscular Suspension    Future Appointments    Date/Time Provider Specialty Site   03/16/2017 10:00 AM Peg Rodgers [facility] Obstetrics/Gynecology [facility]     Signatures   Electronically signed by : Skyler Wilburn [facility]; Dec 30 2016  1:13PM EST                       (Author)    Electronically signed by : LIAM Ludwig ; Dec 30 2016  9:59PM EST                       (Author)

## 2018-01-15 NOTE — PROGRESS NOTES
Chief Complaint  Pt is here for her depo injection no complaints  Given on Rt buttock  Exp:2/2020  lot # [id number]      Active Problems    1  Arthralgia (719 40) (M25 50)   2  Bacterial vaginosis (616 10,041 9) (N76 0,B96 89)   3  Contraceptives (V25 02)   4  Depression with anxiety (300 4) (F41 8)   5  Dermoid cyst of upper extremity, right (216 6) (D23 61)   6  Encounter for gynecological examination without abnormal finding (V72 31) (Z01 419)   7  Encounter for mammogram to establish baseline mammogram (V76 12) (Z12 31)   8  Encounter for management and injection of depo-Provera (V25 49) (Z30 42)   9  Generalized anxiety disorder (300 02) (F41 1)   10  Herpes simplex infection (054 9) (B00 9)   11  Insomnia (780 52) (G47 00)   12  Reactive airway disease (493 90) (J45 909)   13  Skin lesion (709 9) (L98 9)   14  Surveillance for medroxyprogesterone contraception (V25 49) (Z30 42)    Current Meds   1  ALPRAZolam 0 5 MG Oral Tablet; TAKE 1 TABLET BY MOUTH TWICE A DAY AS   NEEDED; Therapy: 99Xkj0839 to (Lear Dilling)  Requested for: 27Jun2016; Last   Rx:27Jun2016 Ordered   2  Escitalopram Oxalate 10 MG Oral Tablet; TAKE 1 TABLET EVERY DAY; Therapy: 12NBL2100 to (Evaluate:42Azj1563)  Requested for: 63SZD9392; Last   Rx:18Feb2016 Ordered   3  MedroxyPROGESTERone Acetate 150 MG/ML Intramuscular Suspension; INJECT 1 ML   INTRAMUSCULARLY ONCE EVERY 3 MONTHS; To Be Done: 57ZDM5557; Status:   HOLD FOR - Administration Ordered   4  MedroxyPROGESTERone Acetate 150 MG/ML Intramuscular Suspension; INJECT 1 ML   INTRAMUSCULARLY ONCE EVERY 3 MONTHS; To Be Done: 18EEL9125; Status:   HOLD FOR - Administration Ordered   5  MedroxyPROGESTERone Acetate 150 MG/ML Intramuscular Suspension; INJECT 1 ML   INTRAMUSCULARLY ONCE EVERY 3 MONTHS; To Be Done: 44TKQ1629; Status:   HOLD FOR - Administration Ordered   6  MedroxyPROGESTERone Acetate 150 MG/ML Intramuscular Suspension; INJECT 1 ML   INTRAMUSCULARLY ONCE EVERY 3 MONTHS;  To Be Done: 64KCY2768; Status:   HOLD FOR - Administration Ordered   7  MedroxyPROGESTERone Acetate 150 MG/ML Intramuscular Suspension; inject 1 ml   intramuscularly once every 3 months; Therapy: 84OVF1067 to (Evaluate:53Nhl1685)  Requested for: 13Sfh2246; Last   Rx:04Sey0061 Ordered   8  MedroxyPROGESTERone Acetate 150 MG/ML Intramuscular Suspension; INJECT   EVERY 12 WEEKS AS DIRECTED; To Be Done:   16Gqm1443; Status: HOLD FOR - Administration Ordered   9  MedroxyPROGESTERone Acetate 150 MG/ML Intramuscular Suspension; INJECT   INTRAMUSCULARLY EVERY 12 WEEKS AS DIRECTED; To Be   Done: 22YMJ8558; Status: HOLD FOR - Administration Ordered   10  Multi-Vitamin Daily Oral Tablet Recorded   11  Proventil  (90 Base) MCG/ACT Inhalation Aerosol Solution; INHALE 1 TO 2    PUFFS EVERY 4 TO 6 HOURS AS NEEDED  Requested for: 36RAU6685; Last    Rx:26Jan2016 Ordered   12  TraMADol HCl - 50 MG Oral Tablet; ONE PO Q 8 HRS  PRN; Therapy: 30Apr2015 to (Last Rx:27Jun2016) Ordered   13  Zolpidem Tartrate 5 MG Oral Tablet; TAKE ONE TABLET AT BEDTIME AS NEEDED FOR    SLEEP; Therapy: 28Apr2016 to (Evaluate:57Qbp8359); Last Rx:28Apr2016 Ordered    Allergies    1  Bactrim DS TABS   2   Sulfa Drugs    Vitals  Signs    Systolic: 847, RUE, Sitting  Diastolic: 80, RUE, Sitting  LMP: 42AIS6358  Height: 5 ft 4 in  Weight: 137 lb   BMI Calculated: 23 52  BSA Calculated: 1 67    Plan  Contraceptives    · MedroxyPROGESTERone Acetate 150 MG/ML Intramuscular Suspension  (Depo-Provera); inject 1 ml intramuscularly once every 3  months   · MedroxyPROGESTERone Acetate 150 MG/ML Intramuscular Suspension;  INJECT INTRAMUSCULARLY EVERY 12 WEEKS AS DIRECTED  Encounter for management and injection of depo-Provera    · MedroxyPROGESTERone Acetate 150 MG/ML Intramuscular Suspension;  INJECT EVERY 12 WEEKS AS DIRECTED   · MedroxyPROGESTERone Acetate 150 MG/ML Intramuscular Suspension  Surveillance for medroxyprogesterone contraception    · MedroxyPROGESTERone Acetate 150 MG/ML Intramuscular Suspension;  INJECT 1 ML INTRAMUSCULARLY ONCE EVERY 3 MONTHS   · MedroxyPROGESTERone Acetate 150 MG/ML Intramuscular Suspension;  INJECT 1 ML INTRAMUSCULARLY ONCE EVERY 3 MONTHS   · MedroxyPROGESTERone Acetate 150 MG/ML Intramuscular Suspension;  INJECT 1 ML INTRAMUSCULARLY ONCE EVERY 3 MONTHS   · MedroxyPROGESTERone Acetate 150 MG/ML Intramuscular Suspension;  INJECT 1 ML INTRAMUSCULARLY ONCE EVERY 3 MONTHS    Future Appointments    Date/Time Provider Specialty Site   10/13/2016 08:40 AM Viral Mark [facility] Obstetrics/Gynecology [facility] OB     Signatures   Electronically signed by : LIAM Hamm ; Jul 21 2016  4:10PM EST

## 2018-01-16 ENCOUNTER — ALLSCRIPTS OFFICE VISIT (OUTPATIENT)
Dept: OTHER | Facility: OTHER | Age: 48
End: 2018-01-16

## 2018-01-16 DIAGNOSIS — Z12.31 ENCOUNTER FOR SCREENING MAMMOGRAM FOR MALIGNANT NEOPLASM OF BREAST: ICD-10-CM

## 2018-01-16 NOTE — PROGRESS NOTES
Chief Complaint  Pt here for her Depo injection, injection given in Left buttock without any difficulty - cmt  Active Problems    1  Arthralgia (719 40) (M25 50)   2  Bacterial vaginosis (616 10,041 9) (N76 0,B96 89)   3  Contraceptives (V25 02)   4  Depression with anxiety (300 4) (F41 8)   5  Encounter for gynecological examination without abnormal finding (V72 31) (Z01 419)   6  Encounter for mammogram to establish baseline mammogram (V76 12) (Z12 31)   7  Encounter for management and injection of depo-Provera (V25 49) (Z30 42)   8  Generalized anxiety disorder (300 02) (F41 1)   9  Herpes simplex infection (054 9) (B00 9)   10  Reactive airway disease (493 90) (J45 909)   11  Surveillance for medroxyprogesterone contraception (V25 49) (Z30 42)    Current Meds   1  ALPRAZolam 0 5 MG Oral Tablet; TAKE 1 TABLET BY MOUTH TWICE A DAY AS   NEEDED; Therapy: 86Xwl6270 to (Gavino Shah)  Requested for: 54YZL3490; Last   Rx:42Rzh5708 Ordered   2  Escitalopram Oxalate 10 MG Oral Tablet; TAKE 1 TABLET EVERY DAY; Therapy: 74AEV1969 to (Evaluate:49Liw1650)  Requested for: 10TUH7025; Last   Rx:50Fpw3120 Ordered   3  MedroxyPROGESTERone Acetate 150 MG/ML Intramuscular Suspension; INJECT 1 ML   INTRAMUSCULARLY ONCE EVERY 3 MONTHS; To Be Done: 75KNQ0661; Status:   HOLD FOR - Administration Ordered   4  MedroxyPROGESTERone Acetate 150 MG/ML Intramuscular Suspension; INJECT 1 ML   INTRAMUSCULARLY ONCE EVERY 3 MONTHS; To Be Done: 45RME2900; Status:   HOLD FOR - Administration Ordered   5  MedroxyPROGESTERone Acetate 150 MG/ML Intramuscular Suspension; INJECT 1 ML   INTRAMUSCULARLY ONCE EVERY 3 MONTHS; To Be Done: 81ZIG5401; Status:   HOLD FOR - Administration Ordered   6  MedroxyPROGESTERone Acetate 150 MG/ML Intramuscular Suspension; INJECT 1 ML   INTRAMUSCULARLY ONCE EVERY 3 MONTHS; To Be Done: 77QKF3792; Status:   HOLD FOR - Administration Ordered   7   MedroxyPROGESTERone Acetate 150 MG/ML Intramuscular Suspension; inject 1 ml   intramuscularly once every 3 months; Therapy: 92VQY9018 to (Evaluate:14Aug2016)  Requested for: 67Idk6443; Last   Rx:24Qzz0508 Ordered   8  MedroxyPROGESTERone Acetate 150 MG/ML Intramuscular Suspension; INJECT   EVERY 12 WEEKS AS DIRECTED; To Be Done:   94Cnf5391; Status: HOLD FOR - Administration Ordered   9  MedroxyPROGESTERone Acetate 150 MG/ML Intramuscular Suspension; INJECT   INTRAMUSCULARLY EVERY 12 WEEKS AS DIRECTED; To Be   Done: 75FFI5536; Status: HOLD FOR - Administration Ordered   10  MetroNIDAZOLE 500 MG Oral Tablet; TAKE 1 TABLET TWICE DAILY UNTIL FINISHED; Therapy: 72QFV3465 to (Evaluate:14Mar2016)  Requested for: 43GKZ2489; Last    Rx:07Mar2016 Ordered   11  Multi-Vitamin Daily Oral Tablet Recorded   12  Proventil  (90 Base) MCG/ACT Inhalation Aerosol Solution; INHALE 1 TO 2    PUFFS EVERY 4 TO 6 HOURS AS NEEDED  Requested for: 85TFS8338; Last    Rx:26Jan2016 Ordered   13  TraMADol HCl - 50 MG Oral Tablet; ONE PO Q 8 HRS  PRN; Therapy: 14Ixs1219 to (Last Rx:07Mar2016) Ordered    Allergies    1  Bactrim DS TABS   2  Sulfa Drugs    Vitals  Signs [Data Includes: Current Encounter]    Systolic: 428, LUE, Sitting  Diastolic: 64, LUE, Sitting  Weight: 140 lb   BMI Calculated: 24 03  BSA Calculated: 1 68  LMP: Amenorrhea    Plan  Encounter for management and injection of depo-Provera    · MedroxyPROGESTERone Acetate 150 MG/ML Intramuscular Suspension    Signatures   Electronically signed by : SHAUNNA Yang;  Apr 28 2016  9:29AM EST                       (Author)    Electronically signed by : LIAM Pabon ; Apr 28 2016  4:47PM EST                       (Author)

## 2018-01-16 NOTE — MISCELLANEOUS
Message  eprescribed flagyl 500 mg bid x 7 days to pts' pharm  via dr Joshua Kilgore    1  Arthralgia (719 40) (M25 50)   2  Bacterial vaginosis (616 10,041 9) (N76 0,B96 89)   3  Contraceptives (V25 02)   4  Depression with anxiety (300 4) (F41 8)   5  Encounter for gynecological examination without abnormal finding (V72 31) (Z01 419)   6  Encounter for mammogram to establish baseline mammogram (V76 12) (Z12 31)   7  Encounter for management and injection of depo-Provera (V25 49) (Z30 42)   8  Generalized anxiety disorder (300 02) (F41 1)   9  Herpes simplex infection (054 9) (B00 9)   10  Reactive airway disease (493 90) (J45 909)   11  Surveillance for medroxyprogesterone contraception (V25 49) (Z30 42)    Current Meds   1  ALPRAZolam 0 5 MG Oral Tablet; TAKE 1 TABLET BY MOUTH TWICE A DAY AS   NEEDED; Therapy: 86Jzq1018 to (Pat Mac)  Requested for: 60QTN9594; Last   Rx:64Gbd7528 Ordered   2  Escitalopram Oxalate 10 MG Oral Tablet; TAKE 1 TABLET EVERY DAY; Therapy: 13YAK4257 to (Evaluate:48Kxp4283)  Requested for: 07WYC5520; Last   Rx:94Wkg4444 Ordered   3  MedroxyPROGESTERone Acetate 150 MG/ML Intramuscular Suspension   (Depo-Provera); inject 1 ml intramuscularly once every 3   months; Therapy: 75LQY6019 to (Evaluate:62Cfm7056)  Requested for: 37Wpa6824; Last   Rx:68Dcr8994 Ordered   4  MedroxyPROGESTERone Acetate 150 MG/ML Intramuscular Suspension; INJECT 1 ML   INTRAMUSCULARLY ONCE EVERY 3 MONTHS; To Be Done: 42FZC8368; Status:   HOLD FOR - Administration Ordered   5  MedroxyPROGESTERone Acetate 150 MG/ML Intramuscular Suspension; INJECT 1 ML   INTRAMUSCULARLY ONCE EVERY 3 MONTHS; To Be Done: 67TKX9407; Status:   HOLD FOR - Administration Ordered   6  MedroxyPROGESTERone Acetate 150 MG/ML Intramuscular Suspension; INJECT 1 ML   INTRAMUSCULARLY ONCE EVERY 3 MONTHS; To Be Done: 85TSB4297; Status:   HOLD FOR - Administration Ordered   7   MedroxyPROGESTERone Acetate 150 MG/ML Intramuscular Suspension; INJECT 1 ML   INTRAMUSCULARLY ONCE EVERY 3 MONTHS; To Be Done: 88SDR5275; Status:   HOLD FOR - Administration Ordered   8  MedroxyPROGESTERone Acetate 150 MG/ML Intramuscular Suspension; INJECT   EVERY 12 WEEKS AS DIRECTED; To Be Done:   10Pny2109; Status: HOLD FOR - Administration Ordered   9  MedroxyPROGESTERone Acetate 150 MG/ML Intramuscular Suspension; INJECT   INTRAMUSCULARLY EVERY 12 WEEKS AS DIRECTED; To Be   Done: 75UIR6742; Status: HOLD FOR - Administration Ordered   10  MetroNIDAZOLE 500 MG Oral Tablet (Flagyl); TAKE 1 TABLET TWICE DAILY UNTIL    FINISHED; Therapy: 14MZC2055 to (Evaluate:14Mar2016)  Requested for: 43NNM2554; Last    Rx:07Mar2016; Status: ACTIVE - Retrospective By Protocol Authorization Ordered   11  Multi-Vitamin Daily Oral Tablet Recorded   12  Proventil  (90 Base) MCG/ACT Inhalation Aerosol Solution; INHALE 1 TO 2    PUFFS EVERY 4 TO 6 HOURS AS NEEDED  Requested for: 07GPH1306; Last    Rx:26Jan2016 Ordered    Allergies    1  Bactrim DS TABS   2   Sulfa Drugs    Signatures   Electronically signed by : Dulce George RN; Mar  7 2016  2:35PM EST                       (Author)

## 2018-01-17 NOTE — PROGRESS NOTES
Assessment   1  Encounter for gynecological examination without abnormal finding (V72 31) (Z01 419)   2  Encounter for management and injection of depo-Provera (V25 49) (Z30 42)    Plan   Encounter for gynecological examination without abnormal finding    · Follow-up visit in 1 year Evaluation and Treatment  Follow-up  Status: Hold For -    Scheduling  Requested for: 94SMC4839   Ordered; For: Encounter for gynecological examination without abnormal finding; Ordered By: Karson Dennis Performed:  Due: 68PTI1364  Encounter for screening mammogram for malignant neoplasm of breast    · MAMMO SCREENING BILATERAL W 3D & CAD; Status:Hold For - Scheduling; Requested    for:16Jan2018; Perform:[facility]; FTQ:88JRL5589;YQDTDJF;PMY:XSWGTOFTA for screening mammogram for malignant neoplasm of breast; Ordered By:Servando Laughlin;  PMH: Contraception    · MedroxyPROGESTERone Acetate 150 MG/ML Intramuscular Suspension Prefilled    Syringe; INJECT 1 ML INTRAMUSCULARLY ONCE EVERY 3 MONTHS   Rx By: Karson Dennis; Dispense: 84 Days ; #:1 X 1 ML Syringe; Refill: 3;For: PMH: Contraception; NELY = N; Verified Transmission to [facility] 45; Last Updated By: System, [employer]; 1/16/2018 10:17:55 AM    Discussion/Summary   healthy adult female the risks and benefits of cervical cancer screening were discussed HPV and Pap Co-testing Done Today Breast cancer screening: the risks and benefits of breast cancer screening were discussed and monthly self breast exam was advised  Advice and education were given regarding weight bearing exercise, calcium supplements and vitamin D supplements  Chief Complaint   Pt is here for her yearly exam, she has no complaints  History of Present Illness   HPI: 56year old white female here for yearly exam, no complaints  She is amenorrheic on Depo and she is happy with this  She continues to work at the [employer]  She is happy there  She still has no insurance      has still not had a mammogram and she was strongly encouraged to schedule one  She continues to smoke and has no desire to quit  GYN HM, Adult Female [facility]: The patient is being seen for a health maintenance evaluation  The last health maintenance visit was 1 year(s) ago  General Health: The patient's health since the last visit is described as good  Lifestyle:  She consumes a diverse and healthy diet  -- She does not have any weight concerns  -- She does not exercise regularly  -- She uses tobacco -- She consumes alcohol  She reports occasional alcohol use  -- She denies drug use  Reproductive health: the patient is premenopausal--   she reports no menstrual problems  -- she uses contraception  For contraception, she uses depo-medroxyprogesterone  -- she is sexually active  Screening: Cervical cancer screening includes a pap smear performed 3/25/2014,neg  -- and-- no previous human papilloma virus screening  Breast cancer screening includes a mammogram performed   Active Problems   1  Arthralgia (719 40) (M25 50)   2  Depression with anxiety (300 4) (F41 8)   3  Dermoid cyst of upper extremity, right (216 6) (D23 61)   4  Encounter for gynecological examination without abnormal finding (V72 31) (Z01 419)   5  Encounter for management and injection of depo-Provera (V25 49) (Z30 42)   6  Encounter for screening mammogram for malignant neoplasm of breast (V76 12)     (Z12 31)   7  Generalized anxiety disorder (300 02) (F41 1)   8  Herpes simplex infection (054 9) (B00 9)   9  Insomnia (780 52) (G47 00)   10   Reactive airway disease (493 90) (J45 909)    Past Medical History    · History of Anxiety (300 00) (F41 9)   · History of Arthritis (V13 4)   · History of Asthma (493 90) (J45 909)   · History of Encounter for management and injection of depo-Provera (V25 49) (Z30 42)   · History of Gynecologic Services Intrauterine Device (IUD) Removal   · History of Herniated nucleus pulposus, L3-4 (722 10) (M51 26)   · History of acute bronchitis (V12 69) (Z87 09)   · History of dizziness (V13 89) (K90 780)   · History of headache (V13 89) (M88 039)   · History of Lumbar canal stenosis (724 02) (M48 061)   · History of Lumbar radiculopathy (724 4) (M54 16)   · History of Peptic Ulcer (V12 71)   · History of Previously Pregnant With 1  Normal Vaginal Deliveries   · History of Pulmonary disease (518 89) (J98 4)   · History of Thoracic radiculopathy (724 4) (M54 14)   · History of Urinary Tract Infection (V13 02)    Surgical History    · History of Cervix Cryosurgery    Family History   Mother    · Family history of Arthritis (V17 7)   · Family history of Osteoporosis (V17 81)   · Family history of Polio   · Family history of Psoriasis  Father    · Family history of Arthritis (V17 7)   · Family history of osteoporosis (V17 81) (Z82 62)   · Family history of Osteoporosis (V17 81)  Son    · Family history of Pulmonary Disease  Maternal Grandmother    · Family history of Diabetes Mellitus (V18 0)   · Family history of Heart Disease (V17 49)   · Family history of Hypertension (V17 49)  Maternal Uncle    · Family history of Carcinoma Of The Tongue (V16 0)  Paternal Uncle    · Family history of lung cancer (V16 1) (Z80 1)    Social History    · Being A Social Drinker   · Currently sexually active   · Daily Coffee Consumption (1  Cups/Day)   · Exercising Regularly   · Former smoker (V15 82) (A24 370)   · Marital History -  (V61 03)    Current Meds    1  ALPRAZolam 0 5 MG Oral Tablet; TAKE 1 TABLET BY MOUTH TWICE A DAY AS     NEEDED; Therapy: 96Yqu6654 to (Rosemarie Lara)  Requested for: 27Jun2016; Last     Rx:27Jun2016 Ordered   2  MedroxyPROGESTERone Acetate 150 MG/ML Intramuscular Suspension; INJECT     INTRAMUSCULARLY EVERY 12 WEEKS AS DIRECTED; Therapy: 72UKL2935-; Last Rx:22Uyg3049; Status: NEED INFORMATION - Billable     Problem Ordered   3  Meloxicam 15 MG Oral Tablet;      Therapy: (Recorded:13Oct2016) to Recorded   4  Multi-Vitamin Daily Oral Tablet Recorded   5  Proventil  (90 Base) MCG/ACT Inhalation Aerosol Solution; INHALE 1 TO 2     PUFFS EVERY 4 TO 6 HOURS AS NEEDED  Requested for: 65SGO4059; Last     Rx:26Jan2016 Ordered   6  TraMADol HCl - 50 MG Oral Tablet; ONE PO Q 8 HRS  PRN; Therapy: 30Apr2015 to (Last Rx:27Jun2016) Ordered    Allergies   1  Bactrim DS TABS   2  Sulfa Drugs   3  Lexapro    Vitals    Recorded: 40HTS3467 30:85WE   Systolic 627, LUE, Sitting   Diastolic 74, LUE, Sitting   Height 5 ft 5 in   Weight 146 lb    BMI Calculated 24 3   BSA Calculated 1 73   LMP Amenorrhea     Physical Exam        Constitutional      General appearance: No acute distress, well appearing and well nourished  Neck      Neck: Normal, supple, trachea midline, no masses  Genitourinary      External genitalia: Normal and no lesions appreciated  Vagina: Normal, no lesions or dryness appreciated  Urethra: Normal        Urethral meatus: Normal        Bladder: Normal, soft, non-tender and no prolapse or masses appreciated  Cervix: Normal, no palpable masses  Uterus: Normal, non-tender, not enlarged, and no palpable masses  Adnexa/parametria: Normal, non-tender and no fullness or masses appreciated  Chest      Breasts: Normal and no dimpling or skin changes noted  Abdomen      Abdomen: Normal, non-tender, and no organomegaly noted         Future Appointments      Date/Time Provider Specialty Site   04/10/2018 10:00 AM [facility] Obstetrics/Gynecology [facility] OB     Signatures    Electronically signed by : Mark Jaquez [facility]; Jan 16 2018 10:51AM EST                       (Author)     Electronically signed by : LIAM Rodríguez ; Jan 16 2018 10:55AM EST                       (Author)

## 2018-01-18 LAB
HPV 18 (HISTORICAL): NOT DETECTED
HPV HIGH RISK 16/18 (HISTORICAL): NOT DETECTED
HPV16 (HISTORICAL): NOT DETECTED
PAP (HISTORICAL): NORMAL

## 2018-01-22 VITALS
HEIGHT: 65 IN | BODY MASS INDEX: 24.32 KG/M2 | DIASTOLIC BLOOD PRESSURE: 74 MMHG | WEIGHT: 146 LBS | SYSTOLIC BLOOD PRESSURE: 114 MMHG

## 2018-01-22 VITALS
HEIGHT: 65 IN | DIASTOLIC BLOOD PRESSURE: 64 MMHG | BODY MASS INDEX: 23.66 KG/M2 | WEIGHT: 142 LBS | SYSTOLIC BLOOD PRESSURE: 122 MMHG

## 2018-01-23 NOTE — PROGRESS NOTES
Chief Complaint  Depo injection given in Left Deltoid without any difficulty - cmt  Active Problems    1  Arthralgia (719 40) (M25 50)   2  Depression with anxiety (300 4) (F41 8)   3  Dermoid cyst of upper extremity, right (216 6) (D23 61)   4  Encounter for gynecological examination without abnormal finding (V72 31) (Z01 419)   5  Encounter for management and injection of depo-Provera (V25 49) (Z30 42)   6  Encounter for screening mammogram for malignant neoplasm of breast (V76 12)   (Z12 31)   7  Generalized anxiety disorder (300 02) (F41 1)   8  Herpes simplex infection (054 9) (B00 9)   9  Insomnia (780 52) (G47 00)   10  Reactive airway disease (493 90) (J45 909)   11  Screening for HPV (human papillomavirus) (V73 81) (Z11 51)    Current Meds   1  ALPRAZolam 0 5 MG Oral Tablet; TAKE 1 TABLET BY MOUTH TWICE A DAY AS   NEEDED; Therapy: 14Dma8401 to (Boyd Santamaria)  Requested for: 27Jun2016; Last   Rx:27Jun2016 Ordered   2  MedroxyPROGESTERone Acetate 150 MG/ML Intramuscular Suspension; INJECT   INTRAMUSCULARLY EVERY 12 WEEKS AS DIRECTED; Therapy: 15VYH2635-; Last Rx:26Efx4873; Status: NEED INFORMATION - Billable   Problem Ordered   3  Meloxicam 15 MG Oral Tablet; Therapy: (Recorded:13Oct2016) to Recorded   4  Multi-Vitamin Daily Oral Tablet Recorded   5  Proventil  (90 Base) MCG/ACT Inhalation Aerosol Solution; INHALE 1 TO 2   PUFFS EVERY 4 TO 6 HOURS AS NEEDED  Requested for: 95UOE2416; Last   Rx:26Jan2016 Ordered   6  TraMADol HCl - 50 MG Oral Tablet; ONE PO Q 8 HRS  PRN; Therapy: 30Apr2015 to (Last Rx:27Jun2016) Ordered    Allergies    1  Bactrim DS TABS   2  Sulfa Drugs   3  Lexapro    Vitals  Signs    Systolic: 277, LUE, Sitting  Diastolic: 74, LUE, Sitting  Height: 5 ft 5 in  Weight: 146 lb   BMI Calculated: 24 3  BSA Calculated: 1 73  LMP: Amenorrhea    Assessment    1  Encounter for gynecological examination without abnormal finding (V72 31) (Z01 419)   2   Encounter for management and injection of depo-Provera (V25 49) (Z30 42)    Plan  Encounter for gynecological examination without abnormal finding    · Follow-up visit in 1 year Evaluation and Treatment  Follow-up  Status: Complete  Done:  35PEW0119  Encounter for gynecological examination without abnormal finding, Screening for HPV  (human papillomavirus)    · (B) PAP WITH HPV PLUS; Status: In Progress - Specimen/Data Collected;   Done:  23QLU6722  : Amenorrhea  Encounter for management and injection of depo-Provera    · MedroxyPROGESTERone Acetate 150 MG/ML Intramuscular Suspension  Encounter for screening mammogram for malignant neoplasm of breast    · MAMMO SCREENING BILATERAL W 3D & CAD; Status:Active;  Requested for:16Jan2018;   PMH: Contraception    · MedroxyPROGESTERone Acetate 150 MG/ML Intramuscular Suspension Prefilled  Syringe; INJECT 1 ML INTRAMUSCULARLY ONCE EVERY 3 MONTHS    Future Appointments    Date/Time Provider Specialty Site   04/10/2018 10:00 AM Alyssa Dandy, [facility] Obstetrics/Gynecology [facility] OB     Signatures   Electronically signed by : LIAM Oswald ; Jan 16 2018  5:46PM EST                       (Author)

## 2018-04-10 ENCOUNTER — TELEPHONE (OUTPATIENT)
Dept: OBGYN CLINIC | Facility: CLINIC | Age: 48
End: 2018-04-10

## 2018-04-10 ENCOUNTER — CLINICAL SUPPORT (OUTPATIENT)
Dept: OBGYN CLINIC | Facility: CLINIC | Age: 48
End: 2018-04-10

## 2018-04-10 VITALS
DIASTOLIC BLOOD PRESSURE: 70 MMHG | HEIGHT: 65 IN | BODY MASS INDEX: 22.66 KG/M2 | WEIGHT: 136 LBS | SYSTOLIC BLOOD PRESSURE: 106 MMHG

## 2018-04-10 DIAGNOSIS — Z30.42 ENCOUNTER FOR MANAGEMENT AND INJECTION OF DEPO-PROVERA: Primary | ICD-10-CM

## 2018-04-10 PROCEDURE — 96373 THER/PROPH/DIAG INJ IA: CPT | Performed by: OBSTETRICS & GYNECOLOGY

## 2018-04-10 RX ORDER — MELOXICAM 15 MG/1
15 TABLET ORAL
COMMUNITY
Start: 2018-01-09 | End: 2019-01-09

## 2018-04-10 RX ORDER — ALPRAZOLAM 0.5 MG/1
0.25 TABLET ORAL DAILY
COMMUNITY
Start: 2018-03-22

## 2018-04-10 RX ORDER — MEDROXYPROGESTERONE ACETATE 150 MG/ML
150 INJECTION, SUSPENSION INTRAMUSCULAR
Qty: 1 ML | Refills: 0 | Status: SHIPPED | OUTPATIENT
Start: 2018-04-10

## 2018-04-10 RX ORDER — ALBUTEROL SULFATE 90 UG/1
2 AEROSOL, METERED RESPIRATORY (INHALATION) EVERY 6 HOURS
COMMUNITY
Start: 2017-04-27

## 2018-04-10 RX ORDER — MEDROXYPROGESTERONE ACETATE 150 MG/ML
INJECTION, SUSPENSION INTRAMUSCULAR
COMMUNITY
Start: 2016-07-13

## 2018-04-10 RX ORDER — TRAMADOL HYDROCHLORIDE 50 MG/1
50 TABLET ORAL EVERY 24 HOURS
COMMUNITY
Start: 2018-01-30

## 2018-04-10 NOTE — PROGRESS NOTES
Patient in today for depo provera injection  Last injection done 1/16/18 in left deltoid, tolerated well  Patient also had yearly exam done 1/16/18  Patient denies problems or concerns with depo, injection given IM in left deltoid (patient request) tolerated well  Scheduling appoinment for next injection       L[facility] [id number]  [facility]31/2020  [facility] [phone]

## 2018-07-03 ENCOUNTER — CLINICAL SUPPORT (OUTPATIENT)
Dept: OBGYN CLINIC | Facility: CLINIC | Age: 48
End: 2018-07-03

## 2018-07-03 VITALS — WEIGHT: 138 LBS | DIASTOLIC BLOOD PRESSURE: 68 MMHG | BODY MASS INDEX: 22.96 KG/M2 | SYSTOLIC BLOOD PRESSURE: 110 MMHG

## 2018-07-03 DIAGNOSIS — Z30.42 ENCOUNTER FOR MANAGEMENT AND INJECTION OF DEPO-PROVERA: Primary | ICD-10-CM

## 2018-07-03 PROCEDURE — 96372 THER/PROPH/DIAG INJ SC/IM: CPT | Performed by: PHYSICIAN ASSISTANT

## 2018-07-03 RX ORDER — DIPHENOXYLATE HYDROCHLORIDE AND ATROPINE SULFATE 2.5; .025 MG/1; MG/1
1 TABLET ORAL
COMMUNITY

## 2018-07-03 RX ORDER — MELOXICAM 15 MG/1
15 TABLET ORAL
COMMUNITY
Start: 2018-05-21 | End: 2018-09-25 | Stop reason: SDUPTHER

## 2018-07-03 RX ORDER — MEDROXYPROGESTERONE ACETATE 150 MG/ML
150 INJECTION, SUSPENSION INTRAMUSCULAR
Status: CANCELLED | OUTPATIENT
Start: 2018-07-03 | End: 2019-09-26

## 2018-07-03 RX ORDER — TURMERIC 100 %
POWDER (GRAM) MISCELLANEOUS
COMMUNITY

## 2018-07-03 RX ORDER — TRAMADOL HYDROCHLORIDE 50 MG/1
50 TABLET ORAL EVERY 8 HOURS PRN
COMMUNITY
Start: 2018-06-28

## 2018-09-25 ENCOUNTER — CLINICAL SUPPORT (OUTPATIENT)
Dept: OBGYN CLINIC | Facility: CLINIC | Age: 48
End: 2018-09-25

## 2018-09-25 VITALS — BODY MASS INDEX: 23.46 KG/M2 | SYSTOLIC BLOOD PRESSURE: 120 MMHG | DIASTOLIC BLOOD PRESSURE: 80 MMHG | WEIGHT: 141 LBS

## 2018-09-25 DIAGNOSIS — Z30.42 ENCOUNTER FOR MANAGEMENT AND INJECTION OF DEPO-PROVERA: Primary | ICD-10-CM

## 2018-09-25 PROCEDURE — 96372 THER/PROPH/DIAG INJ SC/IM: CPT | Performed by: OBSTETRICS & GYNECOLOGY

## 2018-09-25 RX ORDER — MEDROXYPROGESTERONE ACETATE 150 MG/ML
150 INJECTION, SUSPENSION INTRAMUSCULAR ONCE
Status: COMPLETED | OUTPATIENT
Start: 2018-09-25 | End: 2018-09-25

## 2018-09-25 RX ADMIN — MEDROXYPROGESTERONE ACETATE 150 MG: 150 INJECTION, SUSPENSION INTRAMUSCULAR at 15:10

## 2018-09-25 NOTE — PROGRESS NOTES
Pt here for Depo-Provera injection  Injection given in the left deltoid, Pt tolerated well  Lot# [id number], Exp# 5/31/2022, NDC# [address]-6

## 2018-12-03 ENCOUNTER — TELEPHONE (OUTPATIENT)
Dept: OBGYN CLINIC | Facility: CLINIC | Age: 48
End: 2018-12-03

## 2019-05-04 ENCOUNTER — HOSPITAL ENCOUNTER (EMERGENCY)
Facility: HOSPITAL | Age: 49
Discharge: LEFT BEFORE DISCHARGE | End: 2019-05-04

## 2019-05-04 VITALS
DIASTOLIC BLOOD PRESSURE: 93 MMHG | HEART RATE: 78 BPM | WEIGHT: 130 LBS | RESPIRATION RATE: 18 BRPM | SYSTOLIC BLOOD PRESSURE: 122 MMHG | TEMPERATURE: 97.5 F | OXYGEN SATURATION: 100 % | BODY MASS INDEX: 21.63 KG/M2

## 2019-05-04 RX ORDER — ONDANSETRON 4 MG/1
4 TABLET, ORALLY DISINTEGRATING ORAL ONCE
Status: COMPLETED | OUTPATIENT
Start: 2019-05-04 | End: 2019-05-04

## 2019-05-04 RX ADMIN — ONDANSETRON 4 MG: 4 TABLET, ORALLY DISINTEGRATING ORAL at 12:46

## 2020-09-03 LAB — HCV AB SER-ACNC: NEGATIVE
